# Patient Record
Sex: MALE | URBAN - METROPOLITAN AREA
[De-identification: names, ages, dates, MRNs, and addresses within clinical notes are randomized per-mention and may not be internally consistent; named-entity substitution may affect disease eponyms.]

---

## 2021-02-01 ENCOUNTER — APPOINTMENT (OUTPATIENT)
Age: 56
Setting detail: DERMATOLOGY
End: 2021-02-01

## 2021-02-01 VITALS — HEIGHT: 72 IN | WEIGHT: 215 LBS | TEMPERATURE: 98.3 F | RESPIRATION RATE: 18 BRPM

## 2021-02-01 DIAGNOSIS — L21.8 OTHER SEBORRHEIC DERMATITIS: ICD-10-CM

## 2021-02-01 PROCEDURE — OTHER PRESCRIPTION: OTHER

## 2021-02-01 PROCEDURE — OTHER PRESCRIPTION MEDICATION MANAGEMENT: OTHER

## 2021-02-01 PROCEDURE — OTHER MIPS QUALITY: OTHER

## 2021-02-01 PROCEDURE — OTHER COUNSELING: OTHER

## 2021-02-01 PROCEDURE — 99203 OFFICE O/P NEW LOW 30 MIN: CPT

## 2021-02-01 RX ORDER — KETOCONAZOLE 20 MG/G
CREAM TOPICAL
Qty: 1 | Refills: 4 | Status: ERX | COMMUNITY
Start: 2021-02-01

## 2021-02-01 RX ORDER — KETOCONAZOLE 20 MG/ML
SHAMPOO, SUSPENSION TOPICAL BIW
Qty: 1 | Refills: 11 | Status: ERX | COMMUNITY
Start: 2021-02-01

## 2021-02-01 ASSESSMENT — LOCATION SIMPLE DESCRIPTION DERM: LOCATION SIMPLE: RIGHT FOREHEAD

## 2021-02-01 ASSESSMENT — LOCATION ZONE DERM: LOCATION ZONE: FACE

## 2021-02-01 ASSESSMENT — LOCATION DETAILED DESCRIPTION DERM: LOCATION DETAILED: RIGHT MEDIAL FOREHEAD

## 2021-02-01 NOTE — PROCEDURE: PRESCRIPTION MEDICATION MANAGEMENT
Render In Strict Bullet Format?: No
Detail Level: Zone
Initiate Treatment: ketoconazole 2 % topical cream, Apply to affected areas on face and ears twice a day until resolved, then 1 week past clearance.\\nketoconazole 2 % shampoo, Lather into scalp leaving on for 10-15 min then rinse, use 2-4 times per week as needed
Plan: Will re-evaluate in 6 weeks.

## 2021-02-01 NOTE — HPI: RASH
What Type Of Note Output Would You Prefer (Optional)?: Bullet Format
How Severe Is Your Rash?: moderate
Is This A New Presentation, Or A Follow-Up?: Rash
Additional History: Worse in winter. Had redness as long as he can remember.

## 2021-03-15 ENCOUNTER — RX ONLY (RX ONLY)
Age: 56
End: 2021-03-15

## 2021-03-15 ENCOUNTER — APPOINTMENT (OUTPATIENT)
Age: 56
Setting detail: DERMATOLOGY
End: 2021-03-15

## 2021-03-15 VITALS — HEIGHT: 72 IN | RESPIRATION RATE: 18 BRPM | WEIGHT: 215 LBS | TEMPERATURE: 97.8 F

## 2021-03-15 DIAGNOSIS — L21.8 OTHER SEBORRHEIC DERMATITIS: ICD-10-CM

## 2021-03-15 DIAGNOSIS — L71.8 OTHER ROSACEA: ICD-10-CM

## 2021-03-15 PROCEDURE — OTHER COUNSELING: OTHER

## 2021-03-15 PROCEDURE — OTHER PRESCRIPTION: OTHER

## 2021-03-15 PROCEDURE — OTHER PRESCRIPTION MEDICATION MANAGEMENT: OTHER

## 2021-03-15 PROCEDURE — 99213 OFFICE O/P EST LOW 20 MIN: CPT

## 2021-03-15 RX ORDER — IVERMECTIN 10 MG/G
CREAM TOPICAL TWICE A DAY
Qty: 1 | Refills: 4 | Status: ACTIVE

## 2021-03-15 RX ORDER — IVERMECTIN 10 MG/G
CREAM TOPICAL TWICE A DAY
Qty: 1 | Refills: 3 | Status: ERX | COMMUNITY
Start: 2021-03-15

## 2021-03-15 ASSESSMENT — LOCATION DETAILED DESCRIPTION DERM
LOCATION DETAILED: LEFT INFERIOR MEDIAL MALAR CHEEK
LOCATION DETAILED: INFERIOR MID FOREHEAD
LOCATION DETAILED: RIGHT INFERIOR MEDIAL MALAR CHEEK

## 2021-03-15 ASSESSMENT — LOCATION ZONE DERM: LOCATION ZONE: FACE

## 2021-03-15 ASSESSMENT — LOCATION SIMPLE DESCRIPTION DERM
LOCATION SIMPLE: INFERIOR FOREHEAD
LOCATION SIMPLE: LEFT CHEEK
LOCATION SIMPLE: RIGHT CHEEK

## 2021-03-15 NOTE — HPI: RASH
What Type Of Note Output Would You Prefer (Optional)?: Bullet Format
How Severe Is Your Rash?: moderate
Is This A New Presentation, Or A Follow-Up?: Follow Up Rash
Additional History: improved but still red

## 2021-03-15 NOTE — PROCEDURE: PRESCRIPTION MEDICATION MANAGEMENT
Render In Strict Bullet Format?: No
Plan: Will re-evaluate in 7 weeks at [facility]
Initiate Treatment: Soolantra 1 % topical cream, Apply a pea-sized amount to face once every morning
Detail Level: Zone
Continue Regimen: Ketoconazole cream and ketoconazole shampoo as previously directed